# Patient Record
Sex: FEMALE
[De-identification: names, ages, dates, MRNs, and addresses within clinical notes are randomized per-mention and may not be internally consistent; named-entity substitution may affect disease eponyms.]

---

## 2020-07-15 ENCOUNTER — RESULT REVIEW (OUTPATIENT)
Age: 45
End: 2020-07-15

## 2020-08-05 ENCOUNTER — RESULT REVIEW (OUTPATIENT)
Age: 45
End: 2020-08-05

## 2021-08-05 ENCOUNTER — RESULT REVIEW (OUTPATIENT)
Age: 46
End: 2021-08-05

## 2021-08-17 ENCOUNTER — APPOINTMENT (OUTPATIENT)
Dept: INTERVENTIONAL RADIOLOGY/VASCULAR | Facility: HOSPITAL | Age: 46
End: 2021-08-17
Payer: COMMERCIAL

## 2021-08-17 DIAGNOSIS — Z78.9 OTHER SPECIFIED HEALTH STATUS: ICD-10-CM

## 2021-08-17 DIAGNOSIS — D21.9 BENIGN NEOPLASM OF CONNECTIVE AND OTHER SOFT TISSUE, UNSPECIFIED: ICD-10-CM

## 2021-08-17 PROBLEM — Z00.00 ENCOUNTER FOR PREVENTIVE HEALTH EXAMINATION: Status: ACTIVE | Noted: 2021-08-17

## 2021-08-17 PROCEDURE — 99203 OFFICE O/P NEW LOW 30 MIN: CPT | Mod: 95

## 2021-08-17 RX ORDER — OMEPRAZOLE 20 MG/1
20 CAPSULE, DELAYED RELEASE ORAL
Refills: 0 | Status: ACTIVE | COMMUNITY

## 2021-08-17 RX ORDER — IBUPROFEN 600 MG/1
600 TABLET, FILM COATED ORAL
Refills: 0 | Status: ACTIVE | COMMUNITY

## 2021-08-17 RX ORDER — TRANEXAMIC ACID 650 MG/1
TABLET ORAL
Refills: 0 | Status: ACTIVE | COMMUNITY

## 2021-08-17 NOTE — ASSESSMENT
[FreeTextEntry1] : 46 year old woman with menorrhagia and fibroids.  Have ordered MRI pelvis with gadolinium to assess if patient a candidate for uterine fibroid embolization [[place] Powerpoint Presentation] : [place] powerpoint presentation

## 2021-08-17 NOTE — HISTORY OF PRESENT ILLNESS
[FreeTextEntry1] : Ms Shannan Weeks is a 46 year old  first diagnosed with fibroids approximately 6 years ago by US following complaints of menorrhagia.  Her periods last approximately 5 days with first 3 very heavy, often passing clots.  Denies metrorrhagia.  She reports frequent urination and nocturia.  Occasional cramping not always during period. Now for evaluation for possible uterine artery embolization. US shows 4 fibroids 1-3 cm in diameter. Cervical biopsy negative. [Home] : at home, [unfilled] , at the time of the visit. [Medical Office: ([facility])___] : at the medical office located in  [Verbal consent obtained from patient] : the patient, [unfilled] [Menorrhagia] : ~T menorrhagia [Pelvic Pain] : pelvic pain [Urinary Frequency] : urinary frequency [Pressure] : no pressure [Bloating] : no bloating [NSAID] : non-steroidal anti-inflammatory drug [Myomectomy] : no myomectomy [U[place]] : no uterine artery embolization [D & C] : no dilation and curettage [Last Menstrual Period (___)] : Last menstrual period [unfilled] [G ___] : G [unfilled] [P___] : P [unfilled] [Plans for future pregnancies within 2 years] : does not plan to have future pregnancies within 2 years [Stable] : stable

## 2021-08-17 NOTE — CONSULT LETTER
[Dear  ___] : Dear  [unfilled], [Consult Letter:] : I had the pleasure of evaluating your patient, [unfilled]. [Please see my note below.] : Please see my note below. [Consult Closing:] : Thank you very much for allowing me to participate in the care of this patient.  If you have any questions, please do not hesitate to contact me. [Sincerely,] : Sincerely, [FreeTextEntry3] : Zeb Joe MD, FSIR\par Chief Interventional Radiology\par [facility]\par [facility]\par

## 2021-09-16 ENCOUNTER — APPOINTMENT (OUTPATIENT)
Dept: MRI IMAGING | Facility: CLINIC | Age: 46
End: 2021-09-16
Payer: COMMERCIAL

## 2021-09-16 ENCOUNTER — OUTPATIENT (OUTPATIENT)
Dept: OUTPATIENT SERVICES | Facility: HOSPITAL | Age: 46
LOS: 1 days | End: 2021-09-16

## 2021-09-16 ENCOUNTER — RESULT REVIEW (OUTPATIENT)
Age: 46
End: 2021-09-16

## 2021-09-16 PROCEDURE — 72197 MRI PELVIS W/O & W/DYE: CPT | Mod: 26

## 2021-09-23 ENCOUNTER — RESULT REVIEW (OUTPATIENT)
Age: 46
End: 2021-09-23

## 2022-08-10 ENCOUNTER — RESULT REVIEW (OUTPATIENT)
Age: 47
End: 2022-08-10

## 2023-11-07 ENCOUNTER — NON-APPOINTMENT (OUTPATIENT)
Age: 48
End: 2023-11-07